# Patient Record
Sex: FEMALE | Race: BLACK OR AFRICAN AMERICAN | NOT HISPANIC OR LATINO | Employment: UNEMPLOYED | URBAN - METROPOLITAN AREA
[De-identification: names, ages, dates, MRNs, and addresses within clinical notes are randomized per-mention and may not be internally consistent; named-entity substitution may affect disease eponyms.]

---

## 2018-09-08 ENCOUNTER — HOSPITAL ENCOUNTER (EMERGENCY)
Facility: HOSPITAL | Age: 23
Discharge: PSYCHIATRIC OR SUBSTANCE USE FACILITY | End: 2018-09-08
Attending: EMERGENCY MEDICINE
Payer: MEDICAID

## 2018-09-08 VITALS
WEIGHT: 110.25 LBS | TEMPERATURE: 98 F | DIASTOLIC BLOOD PRESSURE: 77 MMHG | HEART RATE: 76 BPM | RESPIRATION RATE: 20 BRPM | OXYGEN SATURATION: 99 % | SYSTOLIC BLOOD PRESSURE: 135 MMHG

## 2018-09-08 DIAGNOSIS — F29 PSYCHOSIS, UNSPECIFIED PSYCHOSIS TYPE: Primary | ICD-10-CM

## 2018-09-08 DIAGNOSIS — F41.9 ANXIETY DISORDER, UNSPECIFIED TYPE: ICD-10-CM

## 2018-09-08 LAB
ALBUMIN SERPL BCP-MCNC: 4.4 G/DL
ALP SERPL-CCNC: 415 U/L
ALT SERPL W/O P-5'-P-CCNC: 9 U/L
AMPHET+METHAMPHET UR QL: NEGATIVE
ANION GAP SERPL CALC-SCNC: 12 MMOL/L
APAP SERPL-MCNC: <3 UG/ML
AST SERPL-CCNC: 17 U/L
B-HCG UR QL: NEGATIVE
BACTERIA #/AREA URNS AUTO: NORMAL /HPF
BARBITURATES UR QL SCN>200 NG/ML: NEGATIVE
BASOPHILS # BLD AUTO: 0.02 K/UL
BASOPHILS NFR BLD: 0.4 %
BENZODIAZ UR QL SCN>200 NG/ML: NEGATIVE
BILIRUB SERPL-MCNC: 0.4 MG/DL
BILIRUB UR QL STRIP: NEGATIVE
BUN SERPL-MCNC: 9 MG/DL
BZE UR QL SCN: NEGATIVE
CALCIUM SERPL-MCNC: 9.8 MG/DL
CANNABINOIDS UR QL SCN: NEGATIVE
CHLORIDE SERPL-SCNC: 106 MMOL/L
CLARITY UR REFRACT.AUTO: CLEAR
CO2 SERPL-SCNC: 24 MMOL/L
COLOR UR AUTO: YELLOW
CREAT SERPL-MCNC: 0.8 MG/DL
CREAT UR-MCNC: 92.3 MG/DL
DIFFERENTIAL METHOD: NORMAL
EOSINOPHIL # BLD AUTO: 0 K/UL
EOSINOPHIL NFR BLD: 0.8 %
ERYTHROCYTE [DISTWIDTH] IN BLOOD BY AUTOMATED COUNT: 13.1 %
EST. GFR  (AFRICAN AMERICAN): >60 ML/MIN/1.73 M^2
EST. GFR  (NON AFRICAN AMERICAN): >60 ML/MIN/1.73 M^2
ETHANOL SERPL-MCNC: <10 MG/DL
GLUCOSE SERPL-MCNC: 105 MG/DL
GLUCOSE UR QL STRIP: NEGATIVE
HCT VFR BLD AUTO: 38.9 %
HGB BLD-MCNC: 12.8 G/DL
HGB UR QL STRIP: ABNORMAL
HYALINE CASTS UR QL AUTO: 0 /LPF
KETONES UR QL STRIP: NEGATIVE
LEUKOCYTE ESTERASE UR QL STRIP: NEGATIVE
LYMPHOCYTES # BLD AUTO: 2 K/UL
LYMPHOCYTES NFR BLD: 40 %
MCH RBC QN AUTO: 28.5 PG
MCHC RBC AUTO-ENTMCNC: 32.9 G/DL
MCV RBC AUTO: 87 FL
METHADONE UR QL SCN>300 NG/ML: NEGATIVE
MICROSCOPIC COMMENT: NORMAL
MONOCYTES # BLD AUTO: 0.4 K/UL
MONOCYTES NFR BLD: 8.9 %
NEUTROPHILS # BLD AUTO: 2.5 K/UL
NEUTROPHILS NFR BLD: 49.7 %
NITRITE UR QL STRIP: NEGATIVE
OPIATES UR QL SCN: NEGATIVE
PCP UR QL SCN>25 NG/ML: NEGATIVE
PH UR STRIP: 8 [PH] (ref 5–8)
PLATELET # BLD AUTO: 255 K/UL
PMV BLD AUTO: 9.9 FL
POTASSIUM SERPL-SCNC: 3.5 MMOL/L
PROT SERPL-MCNC: 9 G/DL
PROT UR QL STRIP: NEGATIVE
RBC # BLD AUTO: 4.49 M/UL
RBC #/AREA URNS AUTO: 3 /HPF (ref 0–4)
SALICYLATES SERPL-MCNC: <5 MG/DL
SODIUM SERPL-SCNC: 142 MMOL/L
SP GR UR STRIP: 1.01 (ref 1–1.03)
SQUAMOUS #/AREA URNS AUTO: 4 /HPF
TOXICOLOGY INFORMATION: NORMAL
TSH SERPL DL<=0.005 MIU/L-ACNC: 3.35 UIU/ML
URN SPEC COLLECT METH UR: ABNORMAL
UROBILINOGEN UR STRIP-ACNC: NEGATIVE EU/DL
WBC # BLD AUTO: 4.95 K/UL
WBC #/AREA URNS AUTO: 1 /HPF (ref 0–5)

## 2018-09-08 PROCEDURE — 81000 URINALYSIS NONAUTO W/SCOPE: CPT | Mod: 59

## 2018-09-08 PROCEDURE — 81025 URINE PREGNANCY TEST: CPT

## 2018-09-08 PROCEDURE — 80320 DRUG SCREEN QUANTALCOHOLS: CPT

## 2018-09-08 PROCEDURE — 80329 ANALGESICS NON-OPIOID 1 OR 2: CPT

## 2018-09-08 PROCEDURE — 80307 DRUG TEST PRSMV CHEM ANLYZR: CPT

## 2018-09-08 PROCEDURE — 84443 ASSAY THYROID STIM HORMONE: CPT

## 2018-09-08 PROCEDURE — 80053 COMPREHEN METABOLIC PANEL: CPT

## 2018-09-08 PROCEDURE — 99282 EMERGENCY DEPT VISIT SF MDM: CPT | Mod: GT,,, | Performed by: PSYCHIATRY & NEUROLOGY

## 2018-09-08 PROCEDURE — 85025 COMPLETE CBC W/AUTO DIFF WBC: CPT

## 2018-09-08 PROCEDURE — 99285 EMERGENCY DEPT VISIT HI MDM: CPT

## 2018-09-08 RX ORDER — HYDROXYZINE HYDROCHLORIDE 25 MG/1
25 TABLET, FILM COATED ORAL 2 TIMES DAILY PRN
Status: ON HOLD | COMMUNITY
End: 2019-04-24 | Stop reason: HOSPADM

## 2018-09-08 NOTE — ED NOTES
Patient in grey gown,yellow socks on patient,room and cabinets secured per hospital protocol, belongings secured, patient directly monitored by leonor rt. Patient currently sleeping in room, in no distress at this time. Will continue to monitor.

## 2018-09-08 NOTE — ED NOTES
Packet faxed to [facility], [facility], [facility], and [facility] per protocol.

## 2018-09-08 NOTE — ED NOTES
Pt escorted to [facility] car with one [place] officer and two [facility] personnel

## 2018-09-08 NOTE — ED NOTES
PEC form completed per Dr Hope.  Sara Hawley RT at BS 1:1 observation.  Family member remains at BS.  [place] CAMPOS aware

## 2018-09-08 NOTE — ED PROVIDER NOTES
"SCRIBE #1 NOTE: I, Deb Iris, am scribing for, and in the presence of, Kym Hope MD. I have scribed the entire note.      History      Chief Complaint   Patient presents with    Hallucinations     c/o hallucinations        Review of patient's allergies indicates:  No Known Allergies     HPI   HPI    9/8/2018, 12:34 AM   History obtained from the mother and patient      History of Present Illness: Collins Orourke is a 23 y.o. female patient who presents to the Emergency Department for auditory hallucinations which onset gradually 2 weeks ago. Mother reports patient constantly listened to music all day and night until 2 weeks ago when she lost access to wifi. Mother reports patient then began putting cotton in her ears to "block out the voices". Mother states patient told her that her biological father was "talking dirty" to her. Mother reports patient's biological passed away a few weeks ago. Mother reports patient was evaluated for sxs and started on Atarax. Patient states "the enemy is messing with me and God is trying to protect me". Symptoms are constant and moderate in severity. No mitigating or exacerbating factors reported. Associated sxs include sleep disturbances and decreased appetite. Patient denies any SI, HI, IV drug use, EtOH use, N/V/D, dysuria, rash, and all other sxs at this time. Patient states she is currently on her menstrual cycle. No further complaints or concerns at this time.     Arrival mode: Personal vehicle      PCP: Wilma Garcia MD       Past Medical History:  History reviewed. No pertinent past medical history.    Past Surgical History:  History reviewed. No pertinent surgical history.      Family History:  History reviewed. No pertinent family history.    Social History:  Social History     Tobacco Use    Smoking status: Never Smoker    Smokeless tobacco: Never Used   Substance and Sexual Activity    Alcohol use: Unknown    Drug use: Unknown    Sexual activity: " Unknown       ROS   Review of Systems   Constitutional: Positive for appetite change (decreased). Negative for chills and fever.   HENT: Negative for sore throat.    Respiratory: Negative for shortness of breath.    Cardiovascular: Negative for chest pain.   Gastrointestinal: Negative for diarrhea, nausea and vomiting.   Genitourinary: Negative for dysuria.   Musculoskeletal: Negative for back pain.   Skin: Negative for rash.   Neurological: Negative for weakness.   Hematological: Does not bruise/bleed easily.   Psychiatric/Behavioral: Positive for hallucinations (auditory) and sleep disturbance. Negative for suicidal ideas.        (-) HI, IV drug use, EtOH use   All other systems reviewed and are negative.      Physical Exam      Initial Vitals [09/08/18 0021]   BP Pulse Resp Temp SpO2   (!) 143/74 86 20 97.8 °F (36.6 °C) 100 %      MAP       --          Physical Exam  Nursing Notes and Vital Signs Reviewed.  Constitutional: Patient is in no acute distress. Well-developed and well-nourished.  Head: Atraumatic. Normocephalic.  Eyes: PERRL. EOM intact. Conjunctivae are not pale. No scleral icterus.  ENT: Mucous membranes are moist. Oropharynx is clear and symmetric.    Neck: Supple. Full ROM. No lymphadenopathy.  Cardiovascular: Regular rate. Regular rhythm. No murmurs, rubs, or gallops. Distal pulses are 2+ and symmetric.  Pulmonary/Chest: No respiratory distress. Clear to auscultation bilaterally. No wheezing or rales.  Abdominal: Soft and non-distended.  There is no tenderness.  No rebound, guarding, or rigidity. Good bowel sounds.  Genitourinary: No CVA tenderness  Musculoskeletal: Moves all extremities. No obvious deformities. No edema. No calf tenderness.  Skin: Warm and dry.  Neurological:  Alert, awake, and appropriate.  Normal speech.  No acute focal neurological deficits are appreciated.  Psychiatric:               Behavior: cooperative, tearful, eye contact minimal              Mood and Affect: tearful  affect              Thought Process: appropriate, no flight of ideas              Suicidal Ideations: No              Suicidal Plan: No specific plan to harm self              Homicidal Ideations: No              Hallucinations: auditory    ED Course    Procedures  ED Vital Signs:  Vitals:    09/08/18 0021   BP: (!) 143/74   Pulse: 86   Resp: 20   Temp: 97.8 °F (36.6 °C)   TempSrc: Oral   SpO2: 100%   Weight: 50 kg (110 lb 3.7 oz)       Abnormal Lab Results:  Labs Reviewed   COMPREHENSIVE METABOLIC PANEL - Abnormal; Notable for the following components:       Result Value    Total Protein 9.0 (*)     Alkaline Phosphatase 415 (*)     ALT 9 (*)     All other components within normal limits   URINALYSIS, REFLEX TO URINE CULTURE - Abnormal; Notable for the following components:    Occult Blood UA 1+ (*)     All other components within normal limits    Narrative:     Preferred Collection Type->Urine, Clean Catch   ACETAMINOPHEN LEVEL - Abnormal; Notable for the following components:    Acetaminophen (Tylenol), Serum <3.0 (*)     All other components within normal limits   SALICYLATE LEVEL - Abnormal; Notable for the following components:    Salicylate Lvl <5.0 (*)     All other components within normal limits   CBC W/ AUTO DIFFERENTIAL   TSH   DRUG SCREEN PANEL, URINE EMERGENCY   ALCOHOL,MEDICAL (ETHANOL)   PREGNANCY TEST, URINE RAPID   URINALYSIS MICROSCOPIC    Narrative:     Preferred Collection Type->Urine, Clean Catch        All Lab Results:  Results for orders placed or performed during the hospital encounter of 09/08/18   CBC auto differential   Result Value Ref Range    WBC 4.95 3.90 - 12.70 K/uL    RBC 4.49 4.00 - 5.40 M/uL    Hemoglobin 12.8 12.0 - 16.0 g/dL    Hematocrit 38.9 37.0 - 48.5 %    MCV 87 82 - 98 fL    MCH 28.5 27.0 - 31.0 pg    MCHC 32.9 32.0 - 36.0 g/dL    RDW 13.1 11.5 - 14.5 %    Platelets 255 150 - 350 K/uL    MPV 9.9 9.2 - 12.9 fL    Gran # (ANC) 2.5 1.8 - 7.7 K/uL    Lymph # 2.0 1.0 - 4.8 K/uL     Mono # 0.4 0.3 - 1.0 K/uL    Eos # 0.0 0.0 - 0.5 K/uL    Baso # 0.02 0.00 - 0.20 K/uL    Gran% 49.7 38.0 - 73.0 %    Lymph% 40.0 18.0 - 48.0 %    Mono% 8.9 4.0 - 15.0 %    Eosinophil% 0.8 0.0 - 8.0 %    Basophil% 0.4 0.0 - 1.9 %    Differential Method Automated    Comprehensive metabolic panel   Result Value Ref Range    Sodium 142 136 - 145 mmol/L    Potassium 3.5 3.5 - 5.1 mmol/L    Chloride 106 95 - 110 mmol/L    CO2 24 23 - 29 mmol/L    Glucose 105 70 - 110 mg/dL    BUN, Bld 9 6 - 20 mg/dL    Creatinine 0.8 0.5 - 1.4 mg/dL    Calcium 9.8 8.7 - 10.5 mg/dL    Total Protein 9.0 (H) 6.0 - 8.4 g/dL    Albumin 4.4 3.5 - 5.2 g/dL    Total Bilirubin 0.4 0.1 - 1.0 mg/dL    Alkaline Phosphatase 415 (H) 55 - 135 U/L    AST 17 10 - 40 U/L    ALT 9 (L) 10 - 44 U/L    Anion Gap 12 8 - 16 mmol/L    eGFR if African American >60.0 >60 mL/min/1.73 m^2    eGFR if non African American >60.0 >60 mL/min/1.73 m^2   TSH   Result Value Ref Range    TSH 3.348 0.400 - 4.000 uIU/mL   Urinalysis, Reflex to Urine Culture Urine, Clean Catch   Result Value Ref Range    Specimen UA Urine, Clean Catch     Color, UA Yellow Yellow, Straw, Meggan    Appearance, UA Clear Clear    pH, UA 8.0 5.0 - 8.0    Specific Gravity, UA 1.010 1.005 - 1.030    Protein, UA Negative Negative    Glucose, UA Negative Negative    Ketones, UA Negative Negative    Bilirubin (UA) Negative Negative    Occult Blood UA 1+ (A) Negative    Nitrite, UA Negative Negative    Urobilinogen, UA Negative <2.0 EU/dL    Leukocytes, UA Negative Negative   Drug screen panel, emergency   Result Value Ref Range    Benzodiazepines Negative     Methadone metabolites Negative     Cocaine (Metab.) Negative     Opiate Scrn, Ur Negative     Barbiturate Screen, Ur Negative     Amphetamine Screen, Ur Negative     THC Negative     Phencyclidine Negative     Creatinine, Random Ur 92.3 15.0 - 325.0 mg/dL    Toxicology Information SEE COMMENT    Ethanol   Result Value Ref Range    Alcohol, Medical,  Serum <10 <10 mg/dL   Acetaminophen level   Result Value Ref Range    Acetaminophen (Tylenol), Serum <3.0 (L) 10.0 - 20.0 ug/mL   Salicylate level   Result Value Ref Range    Salicylate Lvl <5.0 (L) 15.0 - 30.0 mg/dL   Pregnancy, urine rapid (UPT)   Result Value Ref Range    Preg Test, Ur Negative    Urinalysis Microscopic   Result Value Ref Range    RBC, UA 3 0 - 4 /hpf    WBC, UA 1 0 - 5 /hpf    Bacteria, UA None None-Occ /hpf    Squam Epithel, UA 4 /hpf    Hyaline Casts, UA 0 0-1/lpf /lpf    Microscopic Comment SEE COMMENT        Imaging Results:  Imaging Results    None                 The Emergency Provider reviewed the vital signs and test results, which are outlined above.    ED Discussion     2:15 AM: The PEC hold has been issued by Dr. Hope at this time for gravely disabled.    2:17 AM: Pt has been medically cleared by Dr. Hope at this time. Reassessed pt at this time. Pt is resting comfortably and appears in no acute distress. There are no psychiatric services offered at this facility. D/w pt all pertinent ED information and plan to transfer to psychiatric facility for psychiatric treatment. Pt verbalizes understanding. Patient being transferred by [facility] for ongoing personal protection en route. Pt will be transported by personnel trained in CPR and CPI. All questions and complaints have been addressed at this time. Pt condition is stable at this time and is clear to transfer to psychiatric facility at this time.     2:20 AM: Dr. Hope discussed the pt's case with Dr. Latif (Psychiatry) who recommends inpatient psychiatric evaluation.    4:19 AM: Pt has been accepted at [facility]. Accepting physician is Dr. Gautam. Pt will be transported by [facility] [place] for ongoing personal protection en route. Pt will be transported by personnel trained in CPR and CPI. Risks and benefits of transfer/travel were discussed with pt priorly.       ED Medication(s):  Medications - No data to display    This SmartLink is deprecated.  Use AVSMEDLIST instead to display the medication list for a patient.          Medical Decision Making    Medical Decision Making:   Clinical Tests:   Lab Tests: Ordered and Reviewed           Scribe Attestation:   Scribe #1: I performed the above scribed service and the documentation accurately describes the services I performed. I attest to the accuracy of the note.    Attending:   Physician Attestation Statement for Scribe #1: I, Kym Hope MD, personally performed the services described in this documentation, as scribed by Deb Simmons, in my presence, and it is both accurate and complete.          Clinical Impression       ICD-10-CM ICD-9-CM   1. Psychosis, unspecified psychosis type F29 298.9   2. Anxiety disorder, unspecified type F41.9 300.00       Disposition:   Disposition: Transferred  Condition: Fair  Reason for referral: No psychiatric services offered at this facility         Kym Hope MD  09/08/18 1328
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
## 2018-09-08 NOTE — ED NOTES
Spoke with Fadia & informed her that we have not received call from Dr Latif (psychiatry).  She assured me that all information had been given to Dr Latif & she would be calling when she can
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
## 2018-09-08 NOTE — ED NOTES
Patient in grey gown,yellow socks on patient,room and cabinets secured per hospital protocol, belongings secured, patient directly monitored by leonor rt. Patient currently sitting in room, in no distress at this time. Will continue to monitor.

## 2018-09-08 NOTE — ED NOTES
Received call from [facility].  Patient accepted by Adela Schuster NP under the care of Dr Gautam.    Please call report to [phone]. [address]  [phone]

## 2019-04-16 ENCOUNTER — HOSPITAL ENCOUNTER (EMERGENCY)
Facility: HOSPITAL | Age: 24
Discharge: PSYCHIATRIC OR SUBSTANCE USE FACILITY | End: 2019-04-17
Attending: EMERGENCY MEDICINE
Payer: MEDICAID

## 2019-04-16 DIAGNOSIS — F22 DELUSION: ICD-10-CM

## 2019-04-16 DIAGNOSIS — F32.A DEPRESSION, UNSPECIFIED DEPRESSION TYPE: ICD-10-CM

## 2019-04-16 DIAGNOSIS — R44.3 HALLUCINATION: Primary | ICD-10-CM

## 2019-04-16 LAB
BASOPHILS # BLD AUTO: 0.02 K/UL (ref 0–0.2)
BASOPHILS NFR BLD: 0.3 % (ref 0–1.9)
DIFFERENTIAL METHOD: ABNORMAL
EOSINOPHIL # BLD AUTO: 0.1 K/UL (ref 0–0.5)
EOSINOPHIL NFR BLD: 1 % (ref 0–8)
ERYTHROCYTE [DISTWIDTH] IN BLOOD BY AUTOMATED COUNT: 12.7 % (ref 11.5–14.5)
HCT VFR BLD AUTO: 36.3 % (ref 37–48.5)
HGB BLD-MCNC: 11.9 G/DL (ref 12–16)
LYMPHOCYTES # BLD AUTO: 2 K/UL (ref 1–4.8)
LYMPHOCYTES NFR BLD: 32 % (ref 18–48)
MCH RBC QN AUTO: 28.2 PG (ref 27–31)
MCHC RBC AUTO-ENTMCNC: 32.8 G/DL (ref 32–36)
MCV RBC AUTO: 86 FL (ref 82–98)
MONOCYTES # BLD AUTO: 0.7 K/UL (ref 0.3–1)
MONOCYTES NFR BLD: 10.8 % (ref 4–15)
NEUTROPHILS # BLD AUTO: 3.5 K/UL (ref 1.8–7.7)
NEUTROPHILS NFR BLD: 55.9 % (ref 38–73)
PLATELET # BLD AUTO: 312 K/UL (ref 150–350)
PMV BLD AUTO: 9.3 FL (ref 9.2–12.9)
RBC # BLD AUTO: 4.22 M/UL (ref 4–5.4)
WBC # BLD AUTO: 6.19 K/UL (ref 3.9–12.7)

## 2019-04-16 PROCEDURE — 85025 COMPLETE CBC W/AUTO DIFF WBC: CPT | Mod: ER

## 2019-04-16 PROCEDURE — 80320 DRUG SCREEN QUANTALCOHOLS: CPT | Mod: ER

## 2019-04-16 PROCEDURE — 80053 COMPREHEN METABOLIC PANEL: CPT | Mod: ER

## 2019-04-16 PROCEDURE — 80307 DRUG TEST PRSMV CHEM ANLYZR: CPT | Mod: ER

## 2019-04-16 PROCEDURE — 81025 URINE PREGNANCY TEST: CPT | Mod: ER

## 2019-04-16 PROCEDURE — 84443 ASSAY THYROID STIM HORMONE: CPT | Mod: ER

## 2019-04-16 PROCEDURE — 81000 URINALYSIS NONAUTO W/SCOPE: CPT | Mod: 59,ER

## 2019-04-16 PROCEDURE — 80329 ANALGESICS NON-OPIOID 1 OR 2: CPT | Mod: ER

## 2019-04-16 PROCEDURE — 99285 EMERGENCY DEPT VISIT HI MDM: CPT | Mod: ER

## 2019-04-16 RX ORDER — ARIPIPRAZOLE 5 MG/1
5 TABLET ORAL DAILY
Status: ON HOLD | COMMUNITY
End: 2019-04-24 | Stop reason: HOSPADM

## 2019-04-16 RX ORDER — LORAZEPAM 0.5 MG/1
0.5 TABLET ORAL 2 TIMES DAILY
Status: ON HOLD | COMMUNITY
End: 2019-04-24 | Stop reason: HOSPADM

## 2019-04-16 RX ORDER — LORAZEPAM 1 MG/1
2 TABLET ORAL EVERY 4 HOURS PRN
Status: DISCONTINUED | OUTPATIENT
Start: 2019-04-17 | End: 2019-04-17 | Stop reason: HOSPADM

## 2019-04-16 RX ADMIN — LORAZEPAM 2 MG: 1 TABLET ORAL at 11:04

## 2019-04-17 VITALS
RESPIRATION RATE: 20 BRPM | OXYGEN SATURATION: 100 % | HEIGHT: 60 IN | DIASTOLIC BLOOD PRESSURE: 77 MMHG | WEIGHT: 136.69 LBS | HEART RATE: 76 BPM | BODY MASS INDEX: 26.84 KG/M2 | SYSTOLIC BLOOD PRESSURE: 128 MMHG | TEMPERATURE: 98 F

## 2019-04-17 LAB
ALBUMIN SERPL BCP-MCNC: 4.2 G/DL (ref 3.5–5.2)
ALP SERPL-CCNC: 413 U/L (ref 55–135)
ALT SERPL W/O P-5'-P-CCNC: 10 U/L (ref 10–44)
AMPHET+METHAMPHET UR QL: NEGATIVE
ANION GAP SERPL CALC-SCNC: 11 MMOL/L (ref 8–16)
APAP SERPL-MCNC: <3 UG/ML (ref 10–20)
AST SERPL-CCNC: 17 U/L (ref 10–40)
B-HCG UR QL: NEGATIVE
BACTERIA #/AREA URNS AUTO: NORMAL /HPF
BARBITURATES UR QL SCN>200 NG/ML: NEGATIVE
BENZODIAZ UR QL SCN>200 NG/ML: NEGATIVE
BILIRUB SERPL-MCNC: 0.2 MG/DL (ref 0.1–1)
BILIRUB UR QL STRIP: NEGATIVE
BUN SERPL-MCNC: 9 MG/DL (ref 6–20)
BZE UR QL SCN: NEGATIVE
CALCIUM SERPL-MCNC: 9.2 MG/DL (ref 8.7–10.5)
CANNABINOIDS UR QL SCN: NEGATIVE
CHLORIDE SERPL-SCNC: 106 MMOL/L (ref 95–110)
CLARITY UR REFRACT.AUTO: CLEAR
CO2 SERPL-SCNC: 21 MMOL/L (ref 23–29)
COLOR UR AUTO: YELLOW
CREAT SERPL-MCNC: 0.8 MG/DL (ref 0.5–1.4)
CREAT UR-MCNC: 290 MG/DL (ref 15–325)
EST. GFR  (AFRICAN AMERICAN): >60 ML/MIN/1.73 M^2
EST. GFR  (NON AFRICAN AMERICAN): >60 ML/MIN/1.73 M^2
ETHANOL SERPL-MCNC: <10 MG/DL
GLUCOSE SERPL-MCNC: 103 MG/DL (ref 70–110)
GLUCOSE UR QL STRIP: NEGATIVE
HGB UR QL STRIP: ABNORMAL
KETONES UR QL STRIP: NEGATIVE
LEUKOCYTE ESTERASE UR QL STRIP: NEGATIVE
METHADONE UR QL SCN>300 NG/ML: NEGATIVE
MICROSCOPIC COMMENT: NORMAL
NITRITE UR QL STRIP: NEGATIVE
OPIATES UR QL SCN: NEGATIVE
OTHER ELEMENTS URNS MICRO: NORMAL
PCP UR QL SCN>25 NG/ML: NEGATIVE
PH UR STRIP: 6 [PH] (ref 5–8)
POTASSIUM SERPL-SCNC: 3.9 MMOL/L (ref 3.5–5.1)
PROT SERPL-MCNC: 8.4 G/DL (ref 6–8.4)
PROT UR QL STRIP: ABNORMAL
RBC #/AREA URNS AUTO: 2 /HPF (ref 0–4)
SALICYLATES SERPL-MCNC: <5 MG/DL (ref 15–30)
SODIUM SERPL-SCNC: 138 MMOL/L (ref 136–145)
SP GR UR STRIP: >=1.03 (ref 1–1.03)
SQUAMOUS #/AREA URNS AUTO: 2 /HPF
TOXICOLOGY INFORMATION: NORMAL
TSH SERPL DL<=0.005 MIU/L-ACNC: 2.04 UIU/ML (ref 0.4–4)
URN SPEC COLLECT METH UR: ABNORMAL
UROBILINOGEN UR STRIP-ACNC: NEGATIVE EU/DL
WBC #/AREA URNS AUTO: 0 /HPF (ref 0–5)

## 2019-04-17 PROCEDURE — 25000003 PHARM REV CODE 250: Mod: ER | Performed by: EMERGENCY MEDICINE
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
## 2019-04-17 NOTE — ED NOTES
Admit packet faxed to [facility], [facility], [facility], and [facility].

## 2019-04-17 NOTE — ED NOTES
SPD contacted at this time for transport. Requested female techs due to patients mental illness. Dispatch said she would take care of it.
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
## 2019-04-17 NOTE — ED NOTES
Contacted [facility] for ETA. Given 1.5 hour eta. Transportation will be coming from [place].

## 2019-04-17 NOTE — ED NOTES
Patient placed in blue paper scrubs with yellow fall prevention socks. Security notified of eta of transport service. Mother and patient updated on eta of transport service.

## 2019-04-17 NOTE — ED NOTES
Patient accepted by Vasile at [facility] ([address], [place]) for the service of Dr. Velázquez.  Report to be called to [phone].

## 2019-04-18 PROBLEM — D64.9 ANEMIA: Status: ACTIVE | Noted: 2019-04-18

## 2019-04-29 ENCOUNTER — HOSPITAL ENCOUNTER (EMERGENCY)
Facility: HOSPITAL | Age: 24
Discharge: PSYCHIATRIC OR SUBSTANCE USE FACILITY | End: 2019-04-29
Attending: EMERGENCY MEDICINE
Payer: MEDICAID

## 2019-04-29 VITALS
HEART RATE: 88 BPM | DIASTOLIC BLOOD PRESSURE: 78 MMHG | OXYGEN SATURATION: 100 % | BODY MASS INDEX: 27.4 KG/M2 | TEMPERATURE: 99 F | SYSTOLIC BLOOD PRESSURE: 116 MMHG | RESPIRATION RATE: 18 BRPM | HEIGHT: 60 IN | WEIGHT: 139.56 LBS

## 2019-04-29 DIAGNOSIS — R44.0 AUDITORY HALLUCINATION: Primary | ICD-10-CM

## 2019-04-29 DIAGNOSIS — F29 PSYCHOSIS, UNSPECIFIED PSYCHOSIS TYPE: ICD-10-CM

## 2019-04-29 LAB
ALBUMIN SERPL BCP-MCNC: 4 G/DL (ref 3.5–5.2)
ALP SERPL-CCNC: 471 U/L (ref 55–135)
ALT SERPL W/O P-5'-P-CCNC: 10 U/L (ref 10–44)
AMPHET+METHAMPHET UR QL: NEGATIVE
ANION GAP SERPL CALC-SCNC: 10 MMOL/L (ref 8–16)
APAP SERPL-MCNC: <3 UG/ML (ref 10–20)
AST SERPL-CCNC: 18 U/L (ref 10–40)
B-HCG UR QL: NEGATIVE
BARBITURATES UR QL SCN>200 NG/ML: NEGATIVE
BASOPHILS # BLD AUTO: 0.02 K/UL (ref 0–0.2)
BASOPHILS NFR BLD: 0.4 % (ref 0–1.9)
BENZODIAZ UR QL SCN>200 NG/ML: NEGATIVE
BILIRUB SERPL-MCNC: 0.4 MG/DL (ref 0.1–1)
BILIRUB UR QL STRIP: NEGATIVE
BUN SERPL-MCNC: 8 MG/DL (ref 6–20)
BZE UR QL SCN: NEGATIVE
CALCIUM SERPL-MCNC: 9.3 MG/DL (ref 8.7–10.5)
CANNABINOIDS UR QL SCN: NEGATIVE
CHLORIDE SERPL-SCNC: 104 MMOL/L (ref 95–110)
CLARITY UR REFRACT.AUTO: CLEAR
CO2 SERPL-SCNC: 24 MMOL/L (ref 23–29)
COLOR UR AUTO: YELLOW
CREAT SERPL-MCNC: 0.8 MG/DL (ref 0.5–1.4)
CREAT UR-MCNC: 162.2 MG/DL (ref 15–325)
DIFFERENTIAL METHOD: ABNORMAL
EOSINOPHIL # BLD AUTO: 0 K/UL (ref 0–0.5)
EOSINOPHIL NFR BLD: 0.7 % (ref 0–8)
ERYTHROCYTE [DISTWIDTH] IN BLOOD BY AUTOMATED COUNT: 12.9 % (ref 11.5–14.5)
EST. GFR  (AFRICAN AMERICAN): >60 ML/MIN/1.73 M^2
EST. GFR  (NON AFRICAN AMERICAN): >60 ML/MIN/1.73 M^2
ETHANOL SERPL-MCNC: <10 MG/DL
GLUCOSE SERPL-MCNC: 94 MG/DL (ref 70–110)
GLUCOSE UR QL STRIP: NEGATIVE
HCT VFR BLD AUTO: 37.5 % (ref 37–48.5)
HGB BLD-MCNC: 11.9 G/DL (ref 12–16)
HGB UR QL STRIP: NEGATIVE
KETONES UR QL STRIP: NEGATIVE
LEUKOCYTE ESTERASE UR QL STRIP: NEGATIVE
LYMPHOCYTES # BLD AUTO: 2 K/UL (ref 1–4.8)
LYMPHOCYTES NFR BLD: 37 % (ref 18–48)
MCH RBC QN AUTO: 28.2 PG (ref 27–31)
MCHC RBC AUTO-ENTMCNC: 31.7 G/DL (ref 32–36)
MCV RBC AUTO: 89 FL (ref 82–98)
METHADONE UR QL SCN>300 NG/ML: NEGATIVE
MONOCYTES # BLD AUTO: 0.5 K/UL (ref 0.3–1)
MONOCYTES NFR BLD: 8.4 % (ref 4–15)
NEUTROPHILS # BLD AUTO: 2.9 K/UL (ref 1.8–7.7)
NEUTROPHILS NFR BLD: 53.3 % (ref 38–73)
NITRITE UR QL STRIP: NEGATIVE
OPIATES UR QL SCN: NEGATIVE
PCP UR QL SCN>25 NG/ML: NEGATIVE
PH UR STRIP: 6 [PH] (ref 5–8)
PLATELET # BLD AUTO: 318 K/UL (ref 150–350)
PMV BLD AUTO: 9.1 FL (ref 9.2–12.9)
POTASSIUM SERPL-SCNC: 3.9 MMOL/L (ref 3.5–5.1)
PROT SERPL-MCNC: 8.6 G/DL (ref 6–8.4)
PROT UR QL STRIP: NEGATIVE
RBC # BLD AUTO: 4.22 M/UL (ref 4–5.4)
SALICYLATES SERPL-MCNC: <5 MG/DL (ref 15–30)
SODIUM SERPL-SCNC: 138 MMOL/L (ref 136–145)
SP GR UR STRIP: 1.02 (ref 1–1.03)
T4 FREE SERPL-MCNC: 0.9 NG/DL (ref 0.71–1.51)
TOXICOLOGY INFORMATION: NORMAL
TSH SERPL DL<=0.005 MIU/L-ACNC: 0.35 UIU/ML (ref 0.4–4)
URN SPEC COLLECT METH UR: NORMAL
UROBILINOGEN UR STRIP-ACNC: NEGATIVE EU/DL
WBC # BLD AUTO: 5.38 K/UL (ref 3.9–12.7)

## 2019-04-29 PROCEDURE — 80053 COMPREHEN METABOLIC PANEL: CPT | Mod: ER

## 2019-04-29 PROCEDURE — 80307 DRUG TEST PRSMV CHEM ANLYZR: CPT | Mod: ER

## 2019-04-29 PROCEDURE — 99285 EMERGENCY DEPT VISIT HI MDM: CPT | Mod: ER

## 2019-04-29 PROCEDURE — 80320 DRUG SCREEN QUANTALCOHOLS: CPT | Mod: ER

## 2019-04-29 PROCEDURE — 84439 ASSAY OF FREE THYROXINE: CPT | Mod: ER

## 2019-04-29 PROCEDURE — 84443 ASSAY THYROID STIM HORMONE: CPT | Mod: ER

## 2019-04-29 PROCEDURE — 99282 EMERGENCY DEPT VISIT SF MDM: CPT | Mod: GT,,, | Performed by: PSYCHIATRY & NEUROLOGY

## 2019-04-29 PROCEDURE — 99282 PR EMERGENCY DEPT VISIT,LEVEL II: ICD-10-PCS | Mod: GT,,, | Performed by: PSYCHIATRY & NEUROLOGY

## 2019-04-29 PROCEDURE — 85025 COMPLETE CBC W/AUTO DIFF WBC: CPT | Mod: ER

## 2019-04-29 PROCEDURE — 80329 ANALGESICS NON-OPIOID 1 OR 2: CPT | Mod: ER

## 2019-04-29 PROCEDURE — 81003 URINALYSIS AUTO W/O SCOPE: CPT | Mod: ER,59

## 2019-04-29 PROCEDURE — 81025 URINE PREGNANCY TEST: CPT | Mod: ER

## 2019-04-29 NOTE — ED NOTES
Pt belongings:  Pair of socks   Black bottoms  White belt   Colorful bra   2 muscle shirts (Colorful)  Gold/Yellow T shirt   Pair of shoes      Belongings are with tech, mother states if pt is placed she will take them home with her and bring clothes for hospital stay.

## 2019-04-29 NOTE — ED PROVIDER NOTES
Encounter Date: 4/29/2019       History     Chief Complaint   Patient presents with    Hallucinations     auditory hallucinations.      The history is provided by a caregiver.   Mental Health Problem   The primary symptoms include hallucinations and bizarre behavior. The current episode started several days ago. This is a recurrent problem.   The degree of incapacity that she is experiencing as a consequence of her illness is severe. Sequelae of the illness include an inability to care for self. She does not admit to suicidal ideas. She does not have a plan to commit suicide. She does not contemplate harming herself. She has not already injured self. She does not contemplate injuring another person. She has not already  injured another person. Risk factors that are present for mental illness include a history of mental illness.     Review of patient's allergies indicates:   Allergen Reactions    Peanut Swelling     Past Medical History:   Diagnosis Date    Psychosis      History reviewed. No pertinent surgical history.  History reviewed. No pertinent family history.  Social History     Tobacco Use    Smoking status: Never Smoker    Smokeless tobacco: Never Used   Substance Use Topics    Alcohol use: Not Currently    Drug use: Not Currently     Review of Systems   Constitutional: Negative for fever.   HENT: Negative for sore throat.    Respiratory: Negative for shortness of breath.    Cardiovascular: Negative for chest pain.   Gastrointestinal: Negative for nausea.   Genitourinary: Negative for dysuria.   Musculoskeletal: Negative for back pain.   Skin: Negative for rash.   Neurological: Negative for weakness.   Hematological: Does not bruise/bleed easily.   Psychiatric/Behavioral: Positive for hallucinations.       Physical Exam     Initial Vitals [04/29/19 1557]   BP Pulse Resp Temp SpO2   114/76 67 18 97.8 °F (36.6 °C) 100 %      MAP       --         Physical Exam    Nursing note and vitals  reviewed.  Constitutional: She appears well-developed and well-nourished. No distress.   HENT:   Head: Normocephalic and atraumatic.   Mouth/Throat: Oropharynx is clear and moist.   Eyes: Conjunctivae and EOM are normal. Pupils are equal, round, and reactive to light.   Neck: Normal range of motion. Neck supple.   Cardiovascular: Normal rate, regular rhythm and normal heart sounds. Exam reveals no gallop and no friction rub.    No murmur heard.  Pulmonary/Chest: Breath sounds normal. No respiratory distress. She has no wheezes. She has no rhonchi. She has no rales.   Abdominal: Soft. Bowel sounds are normal. She exhibits no distension and no mass. There is no tenderness. There is no rebound and no guarding.   Musculoskeletal: Normal range of motion. She exhibits no edema or tenderness.   Neurological: She is alert and oriented to person, place, and time. She has normal strength.   Skin: Skin is warm and dry. No rash noted.   Psychiatric: Her speech is delayed. She is slowed and withdrawn. Thought content is paranoid. She exhibits a depressed mood.         ED Course   Procedures  Labs Reviewed   CBC W/ AUTO DIFFERENTIAL - Abnormal; Notable for the following components:       Result Value    Hemoglobin 11.9 (*)     Mean Corpuscular Hemoglobin Conc 31.7 (*)     MPV 9.1 (*)     All other components within normal limits   COMPREHENSIVE METABOLIC PANEL - Abnormal; Notable for the following components:    Total Protein 8.6 (*)     Alkaline Phosphatase 471 (*)     All other components within normal limits   SALICYLATE LEVEL - Abnormal; Notable for the following components:    Salicylate Lvl <5.0 (*)     All other components within normal limits   ACETAMINOPHEN LEVEL - Abnormal; Notable for the following components:    Acetaminophen (Tylenol), Serum <3.0 (*)     All other components within normal limits   ALCOHOL,MEDICAL (ETHANOL)   URINALYSIS, REFLEX TO URINE CULTURE   PREGNANCY TEST, URINE RAPID   TSH   DRUG SCREEN PANEL,  URINE EMERGENCY          Imaging Results    None           ED Vital Signs:  Vitals:    04/29/19 1557   BP: 114/76   Pulse: 67   Resp: 18   Temp: 97.8 °F (36.6 °C)   TempSrc: Oral   SpO2: 100%   Weight: 63.3 kg (139 lb 8.8 oz)   Height: 5' (1.524 m)         Abnormal Lab Results:  Labs Reviewed   CBC W/ AUTO DIFFERENTIAL - Abnormal; Notable for the following components:       Result Value    Hemoglobin 11.9 (*)     Mean Corpuscular Hemoglobin Conc 31.7 (*)     MPV 9.1 (*)     All other components within normal limits   COMPREHENSIVE METABOLIC PANEL - Abnormal; Notable for the following components:    Total Protein 8.6 (*)     Alkaline Phosphatase 471 (*)     All other components within normal limits   SALICYLATE LEVEL - Abnormal; Notable for the following components:    Salicylate Lvl <5.0 (*)     All other components within normal limits   ACETAMINOPHEN LEVEL - Abnormal; Notable for the following components:    Acetaminophen (Tylenol), Serum <3.0 (*)     All other components within normal limits   ALCOHOL,MEDICAL (ETHANOL)   URINALYSIS, REFLEX TO URINE CULTURE   PREGNANCY TEST, URINE RAPID   TSH   DRUG SCREEN PANEL, URINE EMERGENCY          All Lab Results:  Results for orders placed or performed during the hospital encounter of 04/29/19   CBC auto differential   Result Value Ref Range    WBC 5.38 3.90 - 12.70 K/uL    RBC 4.22 4.00 - 5.40 M/uL    Hemoglobin 11.9 (L) 12.0 - 16.0 g/dL    Hematocrit 37.5 37.0 - 48.5 %    Mean Corpuscular Volume 89 82 - 98 fL    Mean Corpuscular Hemoglobin 28.2 27.0 - 31.0 pg    Mean Corpuscular Hemoglobin Conc 31.7 (L) 32.0 - 36.0 g/dL    RDW 12.9 11.5 - 14.5 %    Platelets 318 150 - 350 K/uL    MPV 9.1 (L) 9.2 - 12.9 fL    Gran # (ANC) 2.9 1.8 - 7.7 K/uL    Lymph # 2.0 1.0 - 4.8 K/uL    Mono # 0.5 0.3 - 1.0 K/uL    Eos # 0.0 0.0 - 0.5 K/uL    Baso # 0.02 0.00 - 0.20 K/uL    Gran% 53.3 38.0 - 73.0 %    Lymph% 37.0 18.0 - 48.0 %    Mono% 8.4 4.0 - 15.0 %    Eosinophil% 0.7 0.0 - 8.0 %     Basophil% 0.4 0.0 - 1.9 %    Differential Method Automated    Comprehensive metabolic panel   Result Value Ref Range    Sodium 138 136 - 145 mmol/L    Potassium 3.9 3.5 - 5.1 mmol/L    Chloride 104 95 - 110 mmol/L    CO2 24 23 - 29 mmol/L    Glucose 94 70 - 110 mg/dL    BUN, Bld 8 6 - 20 mg/dL    Creatinine 0.8 0.5 - 1.4 mg/dL    Calcium 9.3 8.7 - 10.5 mg/dL    Total Protein 8.6 (H) 6.0 - 8.4 g/dL    Albumin 4.0 3.5 - 5.2 g/dL    Total Bilirubin 0.4 0.1 - 1.0 mg/dL    Alkaline Phosphatase 471 (H) 55 - 135 U/L    AST 18 10 - 40 U/L    ALT 10 10 - 44 U/L    Anion Gap 10 8 - 16 mmol/L    eGFR if African American >60.0 >60 mL/min/1.73 m^2    eGFR if non African American >60.0 >60 mL/min/1.73 m^2   Ethanol   Result Value Ref Range    Alcohol, Medical, Serum <10 <10 mg/dL   Salicylate level   Result Value Ref Range    Salicylate Lvl <5.0 (L) 15.0 - 30.0 mg/dL   Acetaminophen level   Result Value Ref Range    Acetaminophen (Tylenol), Serum <3.0 (L) 10.0 - 20.0 ug/mL           Imaging Results:  Imaging Results    None            The Emergency Provider reviewed the vital signs and test results, which are outlined above.    ED Discussions:  5:13 PM: Pt has been medically cleared by Dr. Han at this time. Reassessed pt at this time. Pt is resting comfortably and appears in no acute distress. There are no psychiatric services offered at this facility. D/w pt all pertinent ED information and plan to transfer to psychiatric facility for psychiatric treatment. Pt verbalizes understanding. Patient being transferred by [facility] for ongoing personal protection en route. Pt will be transported by personnel trained in CPR and CPI. All questions and complaints have been addressed at this time. Pt condition is stable at this time and is clear to transfer to psychiatric facility at this time.                                  Clinical Impression:       ICD-10-CM ICD-9-CM   1. Auditory hallucination R44.0 780.1   2. Psychosis, unspecified  psychosis type F29 298.9           Disposition:   Disposition: Transferred  Condition: Fair                        Leighton Han MD  04/29/19 3838

## 2019-04-30 NOTE — CONSULTS
"[facility]  Psychiatry  Teleconsultation Note    Please see previous notes:    Patient agreeable to consultation via telepsychiatry.    Consultation started: 4/29/2019 at 7:34pm  The chief complaint leading to psychiatric consultation is: psychosis  This consultation was requested by Dr.Nathan Han, the Emergency Department attending physician.  The location of the consulting psychiatrist is [address] [place] [address].  The patient location is [facility].  The patient arrived at the ED at: today    Also present with the patient at the time of the consultation: none      Consults  Subjective:     History of Present Illness:  Collins Bill is a 23 y.o. female with past psychiatric history of Intellectual disability and schizophrenia  presented to ED after hearing command hallucinations.  Today,  Pt reports that she is hearing voices that are telling her to "shoot and that I am ugly" pt is very guarded and constricted affect. Pt reports that she hasnt been sleeping well because of the voices. Pt reports that she doesn't want to go to the hospital and would rather want to go to her home.  Spoke with pts step mother who reports that pt is getting worse over the past couple of days and is concerned pt may harm self or others if she continues have the voices. Mother reports pt was "swing her arms hitting her head and throwing things earlier today" Pt was RIS per mother. She reports that pts biological mother has schizophrenia and is in a group home but had significant psychiatric problems.     Symptoms of Depression:  Patient denies symptoms of depression her questionable reliability  Patient denies any suicidal ideation homicidal ideation.    Symptoms of psychosis: + patient endorsing some paranoid delusions hyper Gnosticist believes auditory command hallucinations.    Symptoms of brionna or hypomania:  Denied    Symptoms of ORLANDO:  Patient endorses severe anxiety regarding her " hallucinations.    Symptoms of PTSD:  Denied    Past Psychiatric History:   Previous Psychiatric Hospitalizations: YES:  twice most recent being this month at [facility]    Previous Medication Trials: YES: unable to give names due to mental state     History of psychotherapy:   denied  Previous Suicide Attempts: NO  History of Violence:  NO  History of physical/sexual abuse: NO  Outpatient psychiatrist (current & past): YES: Psychiatrist in [facility] Hx:  Born: [place]  Education: high school diploma/GED  Marital status: Single  Living situation: Lives with mom and dad and sister  Employment hx: unemployed-previous employment-[employer]  Abuse hx: Denies  Legal hx: Denies  Functioning Relationships: good support system and good relationship with parents     Substance Abuse History:  Denies  Tobacco: NO  Use of Alcohol: denied  Detox/Rehab: NO    Patient's recent discharge medication regimen  ARIPiprazole (ABILIFY) 400 mg SSRR injection Inject 300 mg into the muscle every 28 days.  Qty: 300 mg, Refills: 0       clonazePAM (KLONOPIN) 0.5 MG tablet Take 1 tablet (0.5 mg total) by mouth 2 (two) times daily. for 14 days  Qty: 28 tablet, Refills: 0                 CONTINUE these medications which have CHANGED     Details   ARIPiprazole (ABILIFY) 10 MG Tab Take 1 tablet (10 mg total) by mouth every evening.  Qty: 30 tablet, Refills: 0               Objective:     Vitals:    04/29/19 1557 04/29/19 1931 04/29/19 2248 04/29/19 2255   BP: 114/76 115/70 116/78 116/78   BP Location: Left arm Left arm Left arm Left arm   Patient Position: Sitting Sitting Sitting Sitting   Pulse: 67 79 88 88   Resp: 18 16 18 18   Temp: 97.8 °F (36.6 °C) 98.4 °F (36.9 °C) 98.9 °F (37.2 °C) 98.9 °F (37.2 °C)   TempSrc: Oral Oral Oral Oral   SpO2: 100% 98% 100% 100%   Weight: 63.3 kg (139 lb 8.8 oz)      Height: 5' (1.524 m)           LABS  Results for JOEL THOMPSON SAMMI JOSHI (MRN [id number]) as of 4/30/2019 17:11   Ref.  Range 4/29/2019 16:20 4/29/2019 17:22   WBC Latest Ref Range: 3.90 - 12.70 K/uL 5.38    RBC Latest Ref Range: 4.00 - 5.40 M/uL 4.22    Hemoglobin Latest Ref Range: 12.0 - 16.0 g/dL 11.9 (L)    Hematocrit Latest Ref Range: 37.0 - 48.5 % 37.5    MCV Latest Ref Range: 82 - 98 fL 89    MCH Latest Ref Range: 27.0 - 31.0 pg 28.2    MCHC Latest Ref Range: 32.0 - 36.0 g/dL 31.7 (L)    RDW Latest Ref Range: 11.5 - 14.5 % 12.9    Platelets Latest Ref Range: 150 - 350 K/uL 318    MPV Latest Ref Range: 9.2 - 12.9 fL 9.1 (L)    Gran% Latest Ref Range: 38.0 - 73.0 % 53.3    Gran # (ANC) Latest Ref Range: 1.8 - 7.7 K/uL 2.9    Lymph% Latest Ref Range: 18.0 - 48.0 % 37.0    Lymph # Latest Ref Range: 1.0 - 4.8 K/uL 2.0    Mono% Latest Ref Range: 4.0 - 15.0 % 8.4    Mono # Latest Ref Range: 0.3 - 1.0 K/uL 0.5    Eosinophil% Latest Ref Range: 0.0 - 8.0 % 0.7    Eos # Latest Ref Range: 0.0 - 0.5 K/uL 0.0    Basophil% Latest Ref Range: 0.0 - 1.9 % 0.4    Baso # Latest Ref Range: 0.00 - 0.20 K/uL 0.02    Differential Method Unknown Automated    Sodium Latest Ref Range: 136 - 145 mmol/L 138    Potassium Latest Ref Range: 3.5 - 5.1 mmol/L 3.9    Chloride Latest Ref Range: 95 - 110 mmol/L 104    CO2 Latest Ref Range: 23 - 29 mmol/L 24    Anion Gap Latest Ref Range: 8 - 16 mmol/L 10    BUN, Bld Latest Ref Range: 6 - 20 mg/dL 8    Creatinine Latest Ref Range: 0.5 - 1.4 mg/dL 0.8    eGFR if non African American Latest Ref Range: >60 mL/min/1.73 m^2 >60.0    eGFR if African American Latest Ref Range: >60 mL/min/1.73 m^2 >60.0    Glucose Latest Ref Range: 70 - 110 mg/dL 94    Calcium Latest Ref Range: 8.7 - 10.5 mg/dL 9.3    Alkaline Phosphatase Latest Ref Range: 55 - 135 U/L 471 (H)    PROTEIN TOTAL Latest Ref Range: 6.0 - 8.4 g/dL 8.6 (H)    Albumin Latest Ref Range: 3.5 - 5.2 g/dL 4.0    BILIRUBIN TOTAL Latest Ref Range: 0.1 - 1.0 mg/dL 0.4    AST Latest Ref Range: 10 - 40 U/L 18    ALT Latest Ref Range: 10 - 44 U/L 10    TSH Latest Ref  "Range: 0.400 - 4.000 uIU/mL 0.352 (L)    Free T4 Latest Ref Range: 0.71 - 1.51 ng/dL 0.90    Acetaminophen (Tylenol), Serum Latest Ref Range: 10.0 - 20.0 ug/mL <3.0 (L)    Salicylate Lvl Latest Ref Range: 15.0 - 30.0 mg/dL <5.0 (L)    Preg Test, Ur Unknown  Negative   Alcohol, Medical, Serum Latest Ref Range: <10 mg/dL <10    Benzodiazepines Unknown  Negative   Methadone metabolites Unknown  Negative   Phencyclidine Unknown  Negative   Cocaine (Metab.) Unknown  Negative   Opiate Scrn, Ur Unknown  Negative   Barbiturate Screen, Ur Unknown  Negative   Amphetamine Screen, Ur Unknown  Negative   Marijuana (THC) Metabolite Unknown  Negative   Specimen UA Unknown  Urine, Clean Catch   Color, UA Latest Ref Range: Yellow, Straw, Meggan   Yellow   pH, UA Latest Ref Range: 5.0 - 8.0   6.0   Specific Gravity, UA Latest Ref Range: 1.005 - 1.030   1.025   Appearance, UA Latest Ref Range: Clear   Clear   Protein, UA Latest Ref Range: Negative   Negative   Glucose, UA Latest Ref Range: Negative   Negative   Ketones, UA Latest Ref Range: Negative   Negative   Occult Blood UA Latest Ref Range: Negative   Negative   NITRITE UA Latest Ref Range: Negative   Negative   Urobilinogen, UA Latest Ref Range: <2.0 EU/dL  Negative   Bilirubin (UA) Latest Ref Range: Negative   Negative   Leukocytes, UA Latest Ref Range: Negative   Negative   Creatinine, Random Ur Latest Ref Range: 15.0 - 325.0 mg/dL  162.2   Toxicology Information Unknown  SEE COMMENT     Medical review of symptoms  Patient denies current physical symptoms  Noted some psychomotor retardation not any other abnormal movements    Mental Status Exam  Patient appears appropriate for age somewhat child-like gaunt  Patient's behavior is very guarded limited cooperation minimally engaged in the interview  Patient's speech is very soft slow rate and very monotone  Patient's mood is "depressed   Patient is affect is flat, constricted blunted, nonreactive  Patient's thought process is " limited somewhat thought blocked illogical  Patient's thought content positive for auditory visual hallucinations responding to internal stimuli positive paranoid delusions  Patient's memory is intact to conversation patient's concentration is intact conversation  Patient's insight is impaired   patient's judgment is impaired      Assessment/Plan:     IMPRESSION:   Schizophrenia   Intellectual disability      RECOMMENDATIONS:      DISPOSITION- Once medically cleared, may recommend MRI head and EEG in future if pt continues to not respond to antipsychotics   · Seek Involuntary Inpt psychiatric admission for stabilization of acute psychiatric symptoms.     PSYCHIATRIC MEDICATIONS  · Scheduled- resume home meds- Abilify 10mg qhs and Klonopin 0.5mg BID  · PRN-  Zyprexa 5mg q6 PO/IM for non redirectable agitation ; do not combine or administer within one hour of giving a Benzodiazepine       LEGAL-  · Seek/continue PEC because pt is in imminent danger of hurting self or others and is gravely disabled. Please provide with 1:1 sitter         Consulting clinician was informed of the encounter and consult note.  Please re-consult Telepsych services for further follow up or reassessment       Consultation ended: 4/29/2019 at 8:02pm     FAUZIA CARLTON MD   ON CALL [facility]      DISCLAIMER: This note was prepared with [employer] voice recognition transcription software. Garbled syntax, mangled pronouns, and other bizarre constructions may be attributed to that software system

## 2019-04-30 NOTE — ED NOTES
Faxed packet to [facility] , [facility] , [facility], [facility], [facility] , [facility], [facility], [facility], [facility], [place], [facility], [facility], [facility], [place], [facility], [place], [facility].

## 2019-04-30 NOTE — PLAN OF CARE
"4/29/2019 7:34 PM   Collins Bill   1995   [id number]      PSYCHIATRY TELE-CONSULTATION TO EMERGENCY DEPARTMENT  (Brief plan of care note FULL note to follow later)    Consultation started: 4/29/2019 at 7:34pm  The chief complaint leading to psychiatric consultation is: psychosis  This consultation was requested by Dr.Nathan Han, the Emergency Department attending physician.  The location of the consulting psychiatrist is [address] [place] [address].  The patient location is [facility].  The patient arrived at the ED at: today    Also present with the patient at the time of the consultation: none      HPI  Collins Bill is a 23 y.o. female with past psychiatric history of Intellectual disability and schizophrenia  presented to ED after hearing command hallucinations.  Today,  Pt reports that she is hearing voices that are telling her to "shoot and that I am ugly" pt is very guarded and constricted affect. Pt reports that she hasnt been sleeping well because of the voices. Pt reports that she doesn't want to go to the hospital and would rather want to go to her home.  Spoke with pts step mother who reports that pt is getting worse over the past couple of days and is concerned pt may harm self or others if she continues have the voices. Mother reports pt was "swing her arms hitting her head and throwing things earlier today" Pt was RIS per mother. She reports that pts biological mother has schizophrenia and is in a group home but had significant psychiatric problems.     IMPRESSION:   Schizophrenia   Intellectual disability     RECOMMENDATIONS:     DISPOSITION- Once medically cleared, may recommend MRI head and EEG in future if pt continues to not respond to antipsychotics    Seek Involuntary Inpt psychiatric admission for stabilization of acute psychiatric symptoms.    PSYCHIATRIC MEDICATIONS  · Scheduled- resume home meds- Abilify 10mg qhs and Klonopin 0.5mg BID  · PRN-  " Zyprexa 5mg q6 PO/IM for non redirectable agitation ; do not combine or administer within one hour of giving a Benzodiazepine      LEGAL-   Seek/continue PEC because pt is in imminent danger of hurting self or others and is gravely disabled. Please provide with 1:1 sitter       Consulting clinician was informed of the encounter and consult note.  Please re-consult Telepsych services for further follow up or reassessment      Consultation ended: 4/29/2019 at 8:02pm    FAUZIA CARLTON MD   ON CALL [facility]     DISCLAIMER: This note was prepared with [employer] voice recognition transcription software. Garbled syntax, mangled pronouns, and other bizarre constructions may be attributed to that software system

## 2019-04-30 NOTE — ED NOTES
Pt placed in paper scrubs, out of ER ambulatory with  & SPD personnel X 2.  House supervisor notified, 's offfice faxed

## 2019-04-30 NOTE — ED NOTES
Spoke to Jen from transfer center. She states that the psych md will contact us in approximately 30 minutes.

## 2019-04-30 NOTE — ED NOTES
Patient accepted by Nadege at [facility] ([facility] [place], [place]) for the service of Dr. Adma. Report to be called to [phone].

## 2019-04-30 NOTE — ED NOTES
Report received from BETH Bingham. I have assumed care of the pt at this time. Pt is awake, calm and cooperative, resp e/u, skin warm and dry, nad. Pt is in gray hospital gown with yellow fall prevention socks, and pt's room is clear per PEC protocol. NATALIE Mondragon is at the bedside performing direct observation. Will continue to monitor.

## 2019-04-30 NOTE — ED NOTES
No change in pt status. PEC protocols remain in place. NATALIE Mondragon is at the bedside for direct observation. Will continue to monitor.

## 2019-04-30 NOTE — ED NOTES
Pt and her guardian updated on placement at [facility]. Pt is awake, calm and cooperative, resp e/u, skin warm and dry, nad. PEC safety protocols remain in place. NATALIE oMndragon is at the bedside for direct observation. Will continue to monitor.

## 2019-04-30 NOTE — ED NOTES
Pt is sleeping on ED stretcher, awakens easily to verbal stimuli, resp e/u, skin warm and dry, nad. PEC safety precautions remain in place. Vanity, ERT is at the bedside performing direct observation. Will continue to monitor.

## 2019-09-17 NOTE — ED NOTES
Called rowan    Pt has to be the one to change the site   Number for member to call is  Faxed packet to [facility], [facility], [facility], [facility].

## 2021-03-08 ENCOUNTER — LAB VISIT (OUTPATIENT)
Dept: LAB | Facility: HOSPITAL | Age: 26
End: 2021-03-08
Attending: PSYCHIATRY & NEUROLOGY
Payer: MEDICAID

## 2021-03-08 DIAGNOSIS — Z79.899 ENCOUNTER FOR LONG-TERM (CURRENT) USE OF OTHER MEDICATIONS: Primary | ICD-10-CM

## 2021-03-08 LAB
ALBUMIN SERPL BCP-MCNC: 3.4 G/DL (ref 3.5–5.2)
ALP SERPL-CCNC: 400 U/L (ref 55–135)
ALT SERPL W/O P-5'-P-CCNC: 6 U/L (ref 10–44)
ANION GAP SERPL CALC-SCNC: 8 MMOL/L (ref 8–16)
AST SERPL-CCNC: 15 U/L (ref 10–40)
BASOPHILS # BLD AUTO: 0.01 K/UL (ref 0–0.2)
BASOPHILS NFR BLD: 0.2 % (ref 0–1.9)
BILIRUB SERPL-MCNC: 0.2 MG/DL (ref 0.1–1)
BUN SERPL-MCNC: 5 MG/DL (ref 6–20)
CALCIUM SERPL-MCNC: 8.7 MG/DL (ref 8.7–10.5)
CHLORIDE SERPL-SCNC: 108 MMOL/L (ref 95–110)
CO2 SERPL-SCNC: 24 MMOL/L (ref 23–29)
CREAT SERPL-MCNC: 0.7 MG/DL (ref 0.5–1.4)
DIFFERENTIAL METHOD: ABNORMAL
EOSINOPHIL # BLD AUTO: 0.1 K/UL (ref 0–0.5)
EOSINOPHIL NFR BLD: 2.1 % (ref 0–8)
ERYTHROCYTE [DISTWIDTH] IN BLOOD BY AUTOMATED COUNT: 13.2 % (ref 11.5–14.5)
EST. GFR  (AFRICAN AMERICAN): >60 ML/MIN/1.73 M^2
EST. GFR  (NON AFRICAN AMERICAN): >60 ML/MIN/1.73 M^2
GLUCOSE SERPL-MCNC: 97 MG/DL (ref 70–110)
HCT VFR BLD AUTO: 32.7 % (ref 37–48.5)
HGB BLD-MCNC: 10.3 G/DL (ref 12–16)
IMM GRANULOCYTES # BLD AUTO: 0.01 K/UL (ref 0–0.04)
IMM GRANULOCYTES NFR BLD AUTO: 0.2 % (ref 0–0.5)
LYMPHOCYTES # BLD AUTO: 1.7 K/UL (ref 1–4.8)
LYMPHOCYTES NFR BLD: 35.5 % (ref 18–48)
MCH RBC QN AUTO: 27.5 PG (ref 27–31)
MCHC RBC AUTO-ENTMCNC: 31.5 G/DL (ref 32–36)
MCV RBC AUTO: 87 FL (ref 82–98)
MONOCYTES # BLD AUTO: 0.3 K/UL (ref 0.3–1)
MONOCYTES NFR BLD: 7 % (ref 4–15)
NEUTROPHILS # BLD AUTO: 2.6 K/UL (ref 1.8–7.7)
NEUTROPHILS NFR BLD: 55 % (ref 38–73)
NRBC BLD-RTO: 0 /100 WBC
PLATELET # BLD AUTO: 340 K/UL (ref 150–350)
PMV BLD AUTO: 8.8 FL (ref 9.2–12.9)
POTASSIUM SERPL-SCNC: 3.7 MMOL/L (ref 3.5–5.1)
PROT SERPL-MCNC: 7.9 G/DL (ref 6–8.4)
RBC # BLD AUTO: 3.74 M/UL (ref 4–5.4)
SODIUM SERPL-SCNC: 140 MMOL/L (ref 136–145)
TSH SERPL DL<=0.005 MIU/L-ACNC: 1.49 UIU/ML (ref 0.4–4)
WBC # BLD AUTO: 4.7 K/UL (ref 3.9–12.7)

## 2021-03-08 PROCEDURE — 80053 COMPREHEN METABOLIC PANEL: CPT | Mod: PO | Performed by: PSYCHIATRY & NEUROLOGY

## 2021-03-08 PROCEDURE — 84443 ASSAY THYROID STIM HORMONE: CPT | Mod: PO | Performed by: PSYCHIATRY & NEUROLOGY

## 2021-03-08 PROCEDURE — 36415 COLL VENOUS BLD VENIPUNCTURE: CPT | Mod: PO | Performed by: PSYCHIATRY & NEUROLOGY

## 2021-03-08 PROCEDURE — 80061 LIPID PANEL: CPT | Performed by: PSYCHIATRY & NEUROLOGY

## 2021-03-08 PROCEDURE — 83036 HEMOGLOBIN GLYCOSYLATED A1C: CPT | Performed by: PSYCHIATRY & NEUROLOGY

## 2021-03-08 PROCEDURE — 85025 COMPLETE CBC W/AUTO DIFF WBC: CPT | Mod: PO | Performed by: PSYCHIATRY & NEUROLOGY

## 2021-03-09 LAB
CHOLEST SERPL-MCNC: 129 MG/DL (ref 120–199)
CHOLEST/HDLC SERPL: 2.6 {RATIO} (ref 2–5)
ESTIMATED AVG GLUCOSE: 103 MG/DL (ref 68–131)
HBA1C MFR BLD: 5.2 % (ref 4–5.6)
HDLC SERPL-MCNC: 49 MG/DL (ref 40–75)
HDLC SERPL: 38 % (ref 20–50)
LDLC SERPL CALC-MCNC: 69.8 MG/DL (ref 63–159)
NONHDLC SERPL-MCNC: 80 MG/DL
TRIGL SERPL-MCNC: 51 MG/DL (ref 30–150)

## 2021-10-13 ENCOUNTER — HOSPITAL ENCOUNTER (EMERGENCY)
Facility: HOSPITAL | Age: 26
Discharge: PSYCHIATRIC OR SUBSTANCE USE FACILITY | End: 2021-10-14
Attending: EMERGENCY MEDICINE
Payer: MEDICAID

## 2021-10-13 DIAGNOSIS — R45.851 SUICIDAL IDEATION: Primary | ICD-10-CM

## 2021-10-13 DIAGNOSIS — Z00.8 MEDICAL CLEARANCE FOR PSYCHIATRIC ADMISSION: ICD-10-CM

## 2021-10-13 LAB
ALBUMIN SERPL BCP-MCNC: 3.8 G/DL (ref 3.5–5.2)
ALP SERPL-CCNC: 422 U/L (ref 55–135)
ALT SERPL W/O P-5'-P-CCNC: 13 U/L (ref 10–44)
AMPHET+METHAMPHET UR QL: NEGATIVE
ANION GAP SERPL CALC-SCNC: 13 MMOL/L (ref 8–16)
APAP SERPL-MCNC: <3 UG/ML (ref 10–20)
AST SERPL-CCNC: 22 U/L (ref 10–40)
B-HCG UR QL: NEGATIVE
BARBITURATES UR QL SCN>200 NG/ML: NEGATIVE
BASOPHILS # BLD AUTO: 0.03 K/UL (ref 0–0.2)
BASOPHILS NFR BLD: 0.4 % (ref 0–1.9)
BENZODIAZ UR QL SCN>200 NG/ML: NEGATIVE
BILIRUB SERPL-MCNC: 0.4 MG/DL (ref 0.1–1)
BILIRUB UR QL STRIP: NEGATIVE
BUN SERPL-MCNC: 8 MG/DL (ref 6–20)
BZE UR QL SCN: NEGATIVE
CALCIUM SERPL-MCNC: 9.1 MG/DL (ref 8.7–10.5)
CANNABINOIDS UR QL SCN: NEGATIVE
CHLORIDE SERPL-SCNC: 106 MMOL/L (ref 95–110)
CK SERPL-CCNC: 179 U/L (ref 20–180)
CLARITY UR REFRACT.AUTO: ABNORMAL
CO2 SERPL-SCNC: 20 MMOL/L (ref 23–29)
COLOR UR AUTO: YELLOW
CREAT SERPL-MCNC: 0.7 MG/DL (ref 0.5–1.4)
CREAT UR-MCNC: 327 MG/DL (ref 15–325)
CTP QC/QA: YES
DIFFERENTIAL METHOD: ABNORMAL
EOSINOPHIL # BLD AUTO: 0 K/UL (ref 0–0.5)
EOSINOPHIL NFR BLD: 0.4 % (ref 0–8)
ERYTHROCYTE [DISTWIDTH] IN BLOOD BY AUTOMATED COUNT: 13.2 % (ref 11.5–14.5)
EST. GFR  (AFRICAN AMERICAN): >60 ML/MIN/1.73 M^2
EST. GFR  (NON AFRICAN AMERICAN): >60 ML/MIN/1.73 M^2
ETHANOL SERPL-MCNC: <10 MG/DL
GLUCOSE SERPL-MCNC: 103 MG/DL (ref 70–110)
GLUCOSE UR QL STRIP: NEGATIVE
HCT VFR BLD AUTO: 34.6 % (ref 37–48.5)
HGB BLD-MCNC: 10.8 G/DL (ref 12–16)
HGB UR QL STRIP: ABNORMAL
IMM GRANULOCYTES # BLD AUTO: 0.02 K/UL (ref 0–0.04)
IMM GRANULOCYTES NFR BLD AUTO: 0.3 % (ref 0–0.5)
KETONES UR QL STRIP: ABNORMAL
LEUKOCYTE ESTERASE UR QL STRIP: NEGATIVE
LYMPHOCYTES # BLD AUTO: 1.5 K/UL (ref 1–4.8)
LYMPHOCYTES NFR BLD: 22.2 % (ref 18–48)
MCH RBC QN AUTO: 27.1 PG (ref 27–31)
MCHC RBC AUTO-ENTMCNC: 31.2 G/DL (ref 32–36)
MCV RBC AUTO: 87 FL (ref 82–98)
METHADONE UR QL SCN>300 NG/ML: NEGATIVE
MONOCYTES # BLD AUTO: 0.5 K/UL (ref 0.3–1)
MONOCYTES NFR BLD: 7.1 % (ref 4–15)
NEUTROPHILS # BLD AUTO: 4.7 K/UL (ref 1.8–7.7)
NEUTROPHILS NFR BLD: 69.6 % (ref 38–73)
NITRITE UR QL STRIP: NEGATIVE
NRBC BLD-RTO: 0 /100 WBC
OPIATES UR QL SCN: NEGATIVE
PCP UR QL SCN>25 NG/ML: NEGATIVE
PH UR STRIP: 6 [PH] (ref 5–8)
PLATELET # BLD AUTO: 310 K/UL (ref 150–450)
PMV BLD AUTO: 9.5 FL (ref 9.2–12.9)
POTASSIUM SERPL-SCNC: 3.5 MMOL/L (ref 3.5–5.1)
PROT SERPL-MCNC: 8.4 G/DL (ref 6–8.4)
PROT UR QL STRIP: NEGATIVE
RBC # BLD AUTO: 3.98 M/UL (ref 4–5.4)
SALICYLATES SERPL-MCNC: <5 MG/DL (ref 15–30)
SARS-COV-2 RDRP RESP QL NAA+PROBE: NEGATIVE
SODIUM SERPL-SCNC: 139 MMOL/L (ref 136–145)
SP GR UR STRIP: >=1.03 (ref 1–1.03)
TOXICOLOGY INFORMATION: ABNORMAL
TSH SERPL DL<=0.005 MIU/L-ACNC: 1.71 UIU/ML (ref 0.4–4)
URN SPEC COLLECT METH UR: ABNORMAL
UROBILINOGEN UR STRIP-ACNC: NEGATIVE EU/DL
WBC # BLD AUTO: 6.77 K/UL (ref 3.9–12.7)

## 2021-10-13 PROCEDURE — 80307 DRUG TEST PRSMV CHEM ANLYZR: CPT | Mod: ER | Performed by: EMERGENCY MEDICINE

## 2021-10-13 PROCEDURE — 81003 URINALYSIS AUTO W/O SCOPE: CPT | Mod: ER,59 | Performed by: EMERGENCY MEDICINE

## 2021-10-13 PROCEDURE — 81025 URINE PREGNANCY TEST: CPT | Mod: ER | Performed by: EMERGENCY MEDICINE

## 2021-10-13 PROCEDURE — 82550 ASSAY OF CK (CPK): CPT | Mod: ER | Performed by: EMERGENCY MEDICINE

## 2021-10-13 PROCEDURE — 25000003 PHARM REV CODE 250: Mod: ER | Performed by: EMERGENCY MEDICINE

## 2021-10-13 PROCEDURE — 80053 COMPREHEN METABOLIC PANEL: CPT | Mod: ER | Performed by: EMERGENCY MEDICINE

## 2021-10-13 PROCEDURE — 82077 ASSAY SPEC XCP UR&BREATH IA: CPT | Mod: ER | Performed by: EMERGENCY MEDICINE

## 2021-10-13 PROCEDURE — 80179 DRUG ASSAY SALICYLATE: CPT | Mod: ER | Performed by: EMERGENCY MEDICINE

## 2021-10-13 PROCEDURE — 85025 COMPLETE CBC W/AUTO DIFF WBC: CPT | Mod: ER | Performed by: EMERGENCY MEDICINE

## 2021-10-13 PROCEDURE — 93010 ELECTROCARDIOGRAM REPORT: CPT | Mod: ,,, | Performed by: INTERNAL MEDICINE

## 2021-10-13 PROCEDURE — 80143 DRUG ASSAY ACETAMINOPHEN: CPT | Mod: ER | Performed by: EMERGENCY MEDICINE

## 2021-10-13 PROCEDURE — 99285 EMERGENCY DEPT VISIT HI MDM: CPT | Mod: 25,ER

## 2021-10-13 PROCEDURE — 93010 EKG 12-LEAD: ICD-10-PCS | Mod: ,,, | Performed by: INTERNAL MEDICINE

## 2021-10-13 PROCEDURE — 93005 ELECTROCARDIOGRAM TRACING: CPT | Mod: ER

## 2021-10-13 PROCEDURE — 84443 ASSAY THYROID STIM HORMONE: CPT | Mod: ER | Performed by: EMERGENCY MEDICINE

## 2021-10-13 PROCEDURE — U0002 COVID-19 LAB TEST NON-CDC: HCPCS | Mod: ER | Performed by: EMERGENCY MEDICINE

## 2021-10-13 RX ORDER — FLUOXETINE 10 MG/1
10 TABLET ORAL DAILY
COMMUNITY

## 2021-10-13 RX ORDER — ARIPIPRAZOLE 10 MG/1
TABLET ORAL DAILY
COMMUNITY

## 2021-10-13 RX ORDER — OLANZAPINE 5 MG/1
5 TABLET ORAL NIGHTLY
COMMUNITY

## 2021-10-13 RX ADMIN — POISON ADSORBENT 50 G: 50 SUSPENSION ORAL at 10:10

## 2021-10-14 VITALS
BODY MASS INDEX: 25.71 KG/M2 | HEART RATE: 61 BPM | TEMPERATURE: 98 F | RESPIRATION RATE: 19 BRPM | WEIGHT: 160 LBS | HEIGHT: 66 IN | OXYGEN SATURATION: 100 % | DIASTOLIC BLOOD PRESSURE: 73 MMHG | SYSTOLIC BLOOD PRESSURE: 119 MMHG

## 2022-10-28 ENCOUNTER — HOSPITAL ENCOUNTER (EMERGENCY)
Facility: HOSPITAL | Age: 27
Discharge: HOME | End: 2022-10-29
Attending: EMERGENCY MEDICINE
Payer: MEDICAID

## 2022-10-28 DIAGNOSIS — J10.1 INFLUENZA A: Primary | ICD-10-CM

## 2022-10-28 LAB
B-HCG UR QL: NEGATIVE
BILIRUB UR QL STRIP: NEGATIVE
CLARITY UR REFRACT.AUTO: ABNORMAL
COLOR UR AUTO: YELLOW
CTP QC/QA: YES
CTP QC/QA: YES
GLUCOSE UR QL STRIP: NEGATIVE
HGB UR QL STRIP: ABNORMAL
KETONES UR QL STRIP: NEGATIVE
LEUKOCYTE ESTERASE UR QL STRIP: NEGATIVE
NITRITE UR QL STRIP: NEGATIVE
PH UR STRIP: 8 [PH] (ref 5–8)
POC MOLECULAR INFLUENZA A AGN: POSITIVE
POC MOLECULAR INFLUENZA B AGN: NEGATIVE
PROT UR QL STRIP: ABNORMAL
SARS-COV-2 RDRP RESP QL NAA+PROBE: NEGATIVE
SP GR UR STRIP: 1.01 (ref 1–1.03)
URN SPEC COLLECT METH UR: ABNORMAL
UROBILINOGEN UR STRIP-ACNC: 1 EU/DL

## 2022-10-28 PROCEDURE — 25000003 PHARM REV CODE 250: Mod: ER | Performed by: EMERGENCY MEDICINE

## 2022-10-28 PROCEDURE — 87502 INFLUENZA DNA AMP PROBE: CPT | Mod: ER

## 2022-10-28 PROCEDURE — 81000 URINALYSIS NONAUTO W/SCOPE: CPT | Mod: ER | Performed by: EMERGENCY MEDICINE

## 2022-10-28 PROCEDURE — 99283 EMERGENCY DEPT VISIT LOW MDM: CPT | Mod: 25,ER

## 2022-10-28 PROCEDURE — 25000003 PHARM REV CODE 250: Mod: ER

## 2022-10-28 PROCEDURE — 81025 URINE PREGNANCY TEST: CPT | Mod: ER | Performed by: EMERGENCY MEDICINE

## 2022-10-28 PROCEDURE — 87635 SARS-COV-2 COVID-19 AMP PRB: CPT | Mod: ER | Performed by: EMERGENCY MEDICINE

## 2022-10-28 RX ORDER — IBUPROFEN 200 MG
600 TABLET ORAL
Status: COMPLETED | OUTPATIENT
Start: 2022-10-28 | End: 2022-10-28

## 2022-10-28 RX ORDER — ACETAMINOPHEN 500 MG
1000 TABLET ORAL
Status: COMPLETED | OUTPATIENT
Start: 2022-10-28 | End: 2022-10-28

## 2022-10-28 RX ORDER — IBUPROFEN 200 MG
TABLET ORAL
Status: COMPLETED
Start: 2022-10-28 | End: 2022-10-28

## 2022-10-28 RX ORDER — OSELTAMIVIR PHOSPHATE 75 MG/1
75 CAPSULE ORAL
Status: COMPLETED | OUTPATIENT
Start: 2022-10-28 | End: 2022-10-28

## 2022-10-28 RX ORDER — OSELTAMIVIR PHOSPHATE 75 MG/1
75 CAPSULE ORAL 2 TIMES DAILY
Qty: 9 CAPSULE | Refills: 0 | Status: SHIPPED | OUTPATIENT
Start: 2022-10-28 | End: 2022-11-02

## 2022-10-28 RX ADMIN — IBUPROFEN 600 MG: 200 TABLET, FILM COATED ORAL at 08:10

## 2022-10-28 RX ADMIN — ACETAMINOPHEN 1000 MG: 500 TABLET ORAL at 08:10

## 2022-10-28 RX ADMIN — Medication 600 MG: at 08:10

## 2022-10-28 RX ADMIN — OSELTAMIVIR PHOSPHATE 75 MG: 75 CAPSULE ORAL at 08:10

## 2022-10-28 NOTE — Clinical Note
"Collins "Collins" Gillian Bill was seen and treated in our emergency department on 10/28/2022.  She may return to work on 11/03/2022.       If you have any questions or concerns, please don't hesitate to call.      Gray Melgoza MD"

## 2022-10-29 VITALS
BODY MASS INDEX: 29.49 KG/M2 | OXYGEN SATURATION: 99 % | WEIGHT: 166.44 LBS | HEART RATE: 113 BPM | DIASTOLIC BLOOD PRESSURE: 78 MMHG | TEMPERATURE: 101 F | RESPIRATION RATE: 19 BRPM | HEIGHT: 63 IN | SYSTOLIC BLOOD PRESSURE: 125 MMHG

## 2022-10-29 LAB
BACTERIA #/AREA URNS AUTO: ABNORMAL /HPF
MICROSCOPIC COMMENT: ABNORMAL
RBC #/AREA URNS AUTO: 15 /HPF (ref 0–4)
SQUAMOUS #/AREA URNS AUTO: 3 /HPF

## 2022-10-29 NOTE — ED PROVIDER NOTES
Encounter Date: 10/28/2022       History     Chief Complaint   Patient presents with    Cough     Pt present to ED with a cough and cold like s.s, onset yesterday, known exposure to flu      She believes she has had the flu shot this season but not certain.  Does not believe she would be pregnant.  Has been exposed to flu within the last 2 or 3 days, works at a .  Symptoms onset yesterday of cough and cold-like symptoms, mild chills, no urinary complaints, no other specific complaints.  On arrival, febrile, mildly tachycardic, no distress.  Testing positive for influenza type A, counseled patient and family in detail.    The history is provided by the patient and a relative. No  was used.   Review of patient's allergies indicates:   Allergen Reactions    Peanut Swelling     Past Medical History:   Diagnosis Date    Psychosis      History reviewed. No pertinent surgical history.  History reviewed. No pertinent family history.  Social History     Tobacco Use    Smoking status: Never    Smokeless tobacco: Never   Substance Use Topics    Alcohol use: Not Currently    Drug use: Not Currently     Review of Systems   Constitutional:  Positive for fever. Negative for activity change and fatigue.   HENT:  Positive for congestion. Negative for ear pain, facial swelling, nosebleeds, sinus pressure and sore throat.    Eyes:  Negative for pain, discharge, redness and visual disturbance.   Respiratory:  Positive for cough. Negative for choking, chest tightness, shortness of breath and wheezing.    Cardiovascular:  Negative for chest pain, palpitations and leg swelling.   Gastrointestinal:  Negative for abdominal distention, abdominal pain, nausea and vomiting.   Endocrine: Negative for heat intolerance, polydipsia and polyuria.   Genitourinary:  Negative for difficulty urinating, dysuria, flank pain, hematuria and urgency.   Musculoskeletal:  Negative for back pain, gait problem, joint swelling and  myalgias.   Skin:  Negative for color change and rash.   Allergic/Immunologic: Negative for environmental allergies and food allergies.   Neurological:  Negative for dizziness, weakness, numbness and headaches.   Hematological:  Negative for adenopathy. Does not bruise/bleed easily.   Psychiatric/Behavioral:  Negative for agitation and behavioral problems. The patient is not nervous/anxious.    All other systems reviewed and are negative.    Physical Exam     Initial Vitals [10/28/22 1945]   BP Pulse Resp Temp SpO2   133/80 (!) 121 18 (!) 101 °F (38.3 °C) 96 %      MAP       --         Physical Exam    Nursing note and vitals reviewed.  Constitutional: She appears well-developed and well-nourished. She is not diaphoretic. No distress.   No distress.  Mildly tachycardic and mildly febrile.   HENT:   Head: Normocephalic and atraumatic.   Mouth/Throat: No oropharyngeal exudate.   Eyes: Conjunctivae and EOM are normal. Pupils are equal, round, and reactive to light. Right eye exhibits no discharge. Left eye exhibits no discharge. No scleral icterus.   Neck: Neck supple. No thyromegaly present. No tracheal deviation present. No JVD present.   Normal range of motion.  Cardiovascular:  Normal rate, regular rhythm, normal heart sounds and intact distal pulses.     Exam reveals no gallop and no friction rub.       No murmur heard.  Pulmonary/Chest: Breath sounds normal. No stridor. No respiratory distress. She has no wheezes. She has no rhonchi. She has no rales. She exhibits no tenderness.   Abdominal: Abdomen is soft. Bowel sounds are normal. She exhibits no distension and no mass. There is no abdominal tenderness. There is no rebound and no guarding.   Musculoskeletal:         General: No tenderness or edema. Normal range of motion.      Cervical back: Normal range of motion and neck supple.     Neurological: She is alert and oriented to person, place, and time. She has normal strength.   Skin: Skin is warm and dry. No  rash and no abscess noted. No erythema.   Psychiatric: She has a normal mood and affect. Her behavior is normal. Judgment and thought content normal.       ED Course   Procedures  Labs Reviewed   URINALYSIS, REFLEX TO URINE CULTURE - Abnormal; Notable for the following components:       Result Value    Appearance, UA Hazy (*)     Protein, UA Trace (*)     Occult Blood UA 3+ (*)     All other components within normal limits    Narrative:     Specimen Source->Urine   POCT INFLUENZA A/B MOLECULAR - Abnormal; Notable for the following components:    POC Molecular Influenza A Ag Positive (*)     All other components within normal limits   PREGNANCY TEST, URINE RAPID    Narrative:     Specimen Source->Urine   URINALYSIS MICROSCOPIC   SARS-COV-2 RDRP GENE    Narrative:     .This test utilizes isothermal nucleic acid amplification   technology to detect the SARS-CoV-2 RdRp nucleic acid segment.   The analytical sensitivity (limit of detection) is 125 genome   equivalents/mL.   A POSITIVE result implies infection with the SARS-CoV-2 virus;   the patient is presumed to be contagious.     A NEGATIVE result means that SARS-CoV-2 nucleic acids are not   present above the limit of detection. A NEGATIVE result should be   treated as presumptive. It does not rule out the possibility of   COVID-19 and should not be the sole basis for treatment decisions.   If COVID-19 is strongly suspected based on clinical and exposure   history, re-testing using an alternate molecular assay should be   considered.   This test is only for use under the Food and Drug   Administration s Emergency Use Authorization (EUA).   Commercial kits are provided by [employer].   Performance characteristics of the EUA have been independently   verified by [facility] Department of   Pathology and Laboratory Medicine.   _________________________________________________________________   The authorized Fact Sheet for Healthcare Providers and the  authorized Fact   Sheet for Patients of the ID NOW COVID-19 are available on the FDA   website:     https://www.fda.gov/media/668594/download  https://www.fda.gov/media/837575/download                  Imaging Results    None          Medications   oseltamivir capsule 75 mg (has no administration in time range)   ibuprofen tablet 600 mg (has no administration in time range)   acetaminophen tablet 1,000 mg (1,000 mg Oral Given 10/28/22 2009)                              Clinical Impression:   Final diagnoses:  [J10.1] Influenza A (Primary)      ED Disposition Condition    Discharge Stable          ED Prescriptions       Medication Sig Dispense Start Date End Date Auth. Provider    oseltamivir (TAMIFLU) 75 MG capsule Take 1 capsule (75 mg total) by mouth 2 (two) times daily. for 5 days 9 capsule 10/28/2022 11/2/2022 Gray Melgoza MD          Follow-up Information       Follow up With Specialties Details Why Contact Info    Ann Valles MD Family Medicine  As needed [address] [facility] [address]  [phone]      [facility] - Emergency Dept Emergency Medicine  As needed [id number] [place] [id number] [phone]             Gray Melgoza MD  10/28/22 2054

## 2023-12-16 ENCOUNTER — HOSPITAL ENCOUNTER (EMERGENCY)
Facility: HOSPITAL | Age: 28
Discharge: HOME | End: 2023-12-16
Attending: EMERGENCY MEDICINE
Payer: MEDICAID

## 2023-12-16 VITALS
SYSTOLIC BLOOD PRESSURE: 125 MMHG | OXYGEN SATURATION: 98 % | DIASTOLIC BLOOD PRESSURE: 78 MMHG | WEIGHT: 182.44 LBS | RESPIRATION RATE: 18 BRPM | BODY MASS INDEX: 32.32 KG/M2 | HEART RATE: 82 BPM | TEMPERATURE: 98 F

## 2023-12-16 DIAGNOSIS — M25.532 LEFT WRIST PAIN: ICD-10-CM

## 2023-12-16 DIAGNOSIS — M25.522 LEFT ELBOW PAIN: ICD-10-CM

## 2023-12-16 DIAGNOSIS — S16.1XXA STRAIN OF NECK MUSCLE, INITIAL ENCOUNTER: ICD-10-CM
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
## 2023-12-16 DIAGNOSIS — S39.012A STRAIN OF LUMBAR REGION, INITIAL ENCOUNTER: ICD-10-CM

## 2023-12-16 LAB — B-HCG UR QL: NEGATIVE

## 2023-12-16 PROCEDURE — 25000003 PHARM REV CODE 250: Mod: ER | Performed by: EMERGENCY MEDICINE

## 2023-12-16 PROCEDURE — 99285 EMERGENCY DEPT VISIT HI MDM: CPT | Mod: 25,ER

## 2023-12-16 PROCEDURE — 81025 URINE PREGNANCY TEST: CPT | Mod: ER | Performed by: EMERGENCY MEDICINE

## 2023-12-16 RX ORDER — ACETAMINOPHEN 500 MG
1000 TABLET ORAL
Status: COMPLETED | OUTPATIENT
Start: 2023-12-16 | End: 2023-12-16

## 2023-12-16 RX ADMIN — ACETAMINOPHEN 1000 MG: 500 TABLET ORAL at 10:12

## 2023-12-17 NOTE — ED PROVIDER NOTES
Encounter Date: 12/16/2023       History     Chief Complaint   Patient presents with    Arm Injury     Fell while roller skating. Injured left lower arm     27 y/o F with PMH of intellectual disability and schitzophrenia here after a fall. She was roller skating, and fell backwards. She hit her head, and her left arm. She is complaining of left wrist, elbow, shoulder pain. Also with Head, neck, and back pain. Denies any LOC, numbness, weakness, b/b dysfunction, chest pain, SOB, other injury.     The history is provided by the patient and a caregiver.     Review of patient's allergies indicates:   Allergen Reactions    Peanut Swelling     Past Medical History:   Diagnosis Date    Psychosis      No past surgical history on file.  No family history on file.  Social History     Tobacco Use    Smoking status: Never    Smokeless tobacco: Never   Substance Use Topics    Alcohol use: Not Currently    Drug use: Not Currently     Review of Systems   Constitutional:  Negative for diaphoresis and fever.   HENT:  Negative for congestion, dental problem and sore throat.    Eyes:  Negative for pain and visual disturbance.   Respiratory:  Negative for cough and shortness of breath.    Cardiovascular:  Negative for chest pain and palpitations.   Gastrointestinal:  Negative for abdominal pain, diarrhea, nausea and vomiting.   Genitourinary:  Negative for dysuria and flank pain.   Musculoskeletal:  Positive for arthralgias, back pain and neck pain.   Skin:  Negative for rash and wound.   Neurological:  Positive for headaches. Negative for weakness and numbness.   Psychiatric/Behavioral:  Negative for agitation and confusion.        Physical Exam     Initial Vitals   BP Pulse Resp Temp SpO2   12/16/23 1952 12/16/23 1953 12/16/23 1951 12/16/23 1953 12/16/23 1951   (!) 151/90 90 18 98.2 °F (36.8 °C) 96 %      MAP       --                Physical Exam    Constitutional: She appears well-developed and well-nourished.   HENT:   Head:  Normocephalic.   There is right parietal scalp tenderness, no hematoma or laceration.    Eyes: EOM are normal. Pupils are equal, round, and reactive to light.   Neck: Neck supple.   Normal range of motion.  Cardiovascular:  Normal rate and regular rhythm.           Pulmonary/Chest: Breath sounds normal. No respiratory distress.   Abdominal: She exhibits no distension. There is no abdominal tenderness.   Musculoskeletal:      Cervical back: Normal range of motion and neck supple.      Comments: There is midline C, T, and L spine tenderness. Left wrist, elbow, and shoulder tender. Pain with ROM of the entire left arm. Exam is limited given patient's pain. There is no deformity.      Neurological: She is alert. She has normal strength. No sensory deficit.   Patient is at her neurologic baseline at this time per caregiver at bedside.    Skin: Skin is warm and dry.   Psychiatric: She has a normal mood and affect.         ED Course   Procedures  Labs Reviewed   PREGNANCY TEST, URINE RAPID    Narrative:     Specimen Source->Urine          Imaging Results              X-Ray Elbow Complete Left (Final result)  Result time 12/16/23 21:51:11      Final result by John Salinas MD (12/16/23 21:51:11)                   Impression:      As above.      Electronically signed by: John Salinas  Date:    12/16/2023  Time:    21:51               Narrative:    EXAMINATION:  XR ELBOW COMPLETE 3 VIEW LEFT    CLINICAL HISTORY:  Pain in left elbow    TECHNIQUE:  AP, lateral, and oblique views of the left elbow were performed.    COMPARISON:  None    FINDINGS:  No fracture.  No traumatic malalignment.  No osseous destructive process.  No significant degenerative changes.                                       X-Ray Shoulder 2 or More Views Left (Final result)  Result time 12/16/23 21:54:22      Final result by John Salinas MD (12/16/23 21:54:22)                   Impression:      No acute abnormality identified.      Electronically  signed by: John Salinas  Date:    12/16/2023  Time:    21:54               Narrative:    EXAMINATION:  XR SHOULDER COMPLETE 2 OR MORE VIEWS LEFT    CLINICAL HISTORY:  Left shoulder pain;    TECHNIQUE:  Two or three views of the left shoulder were performed.    COMPARISON:  None    FINDINGS:  No fracture.  No traumatic malalignment.  No osseous destructive process.                                       X-Ray Wrist Complete Left (Final result)  Result time 12/16/23 21:54:41      Final result by John Salinas MD (12/16/23 21:54:41)                   Impression:      No acute abnormality identified.      Electronically signed by: John Salinas  Date:    12/16/2023  Time:    21:54               Narrative:    EXAMINATION:  XR WRIST COMPLETE 3 VIEWS LEFT    CLINICAL HISTORY:  Pain in left wrist    TECHNIQUE:  PA, lateral, and oblique views of the left wrist were performed.    COMPARISON:  None    FINDINGS:  No fracture.  No traumatic malalignment.  No osseous destructive process.                                       CT Cervical Spine Without Contrast (Final result)  Result time 12/16/23 21:47:21      Final result by Sola Lee MD (12/16/23 21:47:21)                   Impression:      No acute osseous finding; ligamentous injury not excluded by CT      Electronically signed by: Sola Lee  Date:    12/16/2023  Time:    21:47               Narrative:    EXAMINATION:  CT CERVICAL SPINE WITHOUT CONTRAST    CLINICAL HISTORY:  Neck trauma, midline tenderness (Age 16-64y);    TECHNIQUE:  Low dose axial images, sagittal and coronal reformations were performed though the cervical spine.  Contrast was not administered.    COMPARISON:  None    FINDINGS:  No acute fracture or traumatic malalignment seen.  There is reversal of normal lordosis.                                       CT Lumbar Spine Without Contrast (Final result)  Result time 12/16/23 21:43:33      Final result by Sola Lee MD (12/16/23  21:43:33)                   Impression:      No overt acute osseous finding on significantly degraded imaging related to malpositioning streak artifact suboptimal/substandard exam      Electronically signed by: Sola Lee  Date:    12/16/2023  Time:    21:43               Narrative:    EXAMINATION:  CT LUMBAR SPINE WITHOUT CONTRAST    CLINICAL HISTORY:  Low back pain, trauma;    TECHNIQUE:  Low-dose axial, sagittal and coronal reformations are obtained through the lumbar spine.  Contrast was not administered.    COMPARISON:  None.    FINDINGS:  Imaging degraded by artifact related to malpositioning streak.  As visualized no overt acute fracture or traumatic malalignment seen.                                       CT Head Without Contrast (Final result)  Result time 12/16/23 21:27:05      Final result by John Salinas MD (12/16/23 21:27:05)                   Impression:      No acute intracranial CT abnormality.    Scalp hematoma.    All CT scans at this facility are performed  using dose modulation techniques as appropriate to performed exam including the following:  automated exposure control; adjustment of mA and/or kV according to the patients size (this includes techniques or standardized protocols for targeted exams where dose is matched to indication/reason for exam: i.e. extremities or head);  iterative reconstruction technique.      Electronically signed by: John Salinas  Date:    12/16/2023  Time:    21:27               Narrative:    EXAMINATION:  CT HEAD WITHOUT CONTRAST    CLINICAL HISTORY:  Head trauma, abnormal mental status (Age 19-64y);    TECHNIQUE:  Low dose axial CT images obtained throughout the head without intravenous contrast. Sagittal and coronal reconstructions were performed.    COMPARISON:  None.    FINDINGS:  Intracranial compartment:    Ventricles and sulci are normal in size for age without evidence of hydrocephalus. No extra-axial blood or fluid collections.    The brain  parenchyma appears normal. No parenchymal mass, hemorrhage, edema or major vascular distribution infarct.    Skull/extracranial contents (limited evaluation): No fracture. Mastoid air cells and paranasal sinuses are essentially clear.  Scalp hematoma.                                       CT Thoracic Spine Without Contrast (Final result)  Result time 12/16/23 21:31:53      Final result by John Salinas MD (12/16/23 21:31:53)                   Impression:      No fracture or traumatic malalignment.  No osseous destructive process.  Correlation and further evaluation as warranted.    All CT scans at this facility are performed  using dose modulation techniques as appropriate to performed exam including the following:  automated exposure control; adjustment of mA and/or kV according to the patients size (this includes techniques or standardized protocols for targeted exams where dose is matched to indication/reason for exam: i.e. extremities or head);  iterative reconstruction technique.      Electronically signed by: John Salinas  Date:    12/16/2023  Time:    21:31               Narrative:    EXAMINATION:  CT THORACIC SPINE WITHOUT CONTRAST    CLINICAL HISTORY:  Back trauma, no prior imaging (Age >= 16y);    TECHNIQUE:  Low-dose axial noncontrast CT images of the thoracic spine.  Multiplanar reformats generated    COMPARISON:  None    FINDINGS:  Thoracic spine alignment is well preserved.  No findings to suggest vertebral body fracture.  No osseous lytic or blastic process.    Scattered minimal degenerative changes.    Other incidental included soft tissues are unremarkable.                                       Medications   acetaminophen tablet 1,000 mg (1,000 mg Oral Given 12/16/23 2205)     Medical Decision Making  DDx includes fracture, sprain, contusion    Amount and/or Complexity of Data Reviewed  Radiology: ordered.    Risk  OTC drugs.               ED Course as of 12/17/23 0024   Sat Dec 16, 2023    2200 12:24 AM Reassessment: I reassessed the pt.  The pt is resting comfortably and is NAD.  Pt states their sx have improved. Discussed test results, shared treatment plan, specific conditions for return, and the need for f/u.  Answered their questions at this time.  Pt understands and agrees to the plan.  The pt has remained hemodynamically stable through ED course and is stable for discharge.    [BA]      ED Course User Index  [BA] Bobby Craig MD                           Clinical Impression:  Final diagnoses:  [M25.532] Left wrist pain  [M25.522] Left elbow pain  [W19.XXXA] Fall, initial encounter (Primary)  [S16.1XXA] Strain of neck muscle, initial encounter  [S39.012A] Strain of lumbar region, initial encounter          ED Disposition Condition    Discharge Stable          ED Prescriptions    None       Follow-up Information       Follow up With Specialties Details Why Contact Info    Ann Valles MD Family Medicine Schedule an appointment as soon as possible for a visit in 2 days For re-evaluation and further treatment [address] [place] [address] [id number] [phone]      [facility] Emergency Dept Emergency Medicine Go today If symptoms worsen, For re-evaluation and further treatment, As needed [id number] [facility] [address]  [facility] [id number] [phone]             Bobby Craig MD  12/17/23 0024